# Patient Record
Sex: FEMALE | Race: BLACK OR AFRICAN AMERICAN | NOT HISPANIC OR LATINO | Employment: OTHER | URBAN - METROPOLITAN AREA
[De-identification: names, ages, dates, MRNs, and addresses within clinical notes are randomized per-mention and may not be internally consistent; named-entity substitution may affect disease eponyms.]

---

## 2019-06-05 PROBLEM — Z91.81 AT HIGH RISK FOR INJURY RELATED TO FALL: Status: ACTIVE | Noted: 2019-06-05

## 2019-06-07 PROBLEM — Z79.4 TYPE 2 DIABETES MELLITUS WITHOUT COMPLICATION, WITH LONG-TERM CURRENT USE OF INSULIN: Status: ACTIVE | Noted: 2019-06-07

## 2019-06-07 PROBLEM — I51.89 GRADE I DIASTOLIC DYSFUNCTION: Status: ACTIVE | Noted: 2019-06-07

## 2019-06-07 PROBLEM — I10 ESSENTIAL HYPERTENSION: Status: ACTIVE | Noted: 2019-06-07

## 2019-06-07 PROBLEM — E55.9 VITAMIN D DEFICIENCY: Status: ACTIVE | Noted: 2019-06-07

## 2019-06-07 PROBLEM — J45.30 MILD PERSISTENT ASTHMA WITHOUT COMPLICATION: Status: ACTIVE | Noted: 2019-06-07

## 2019-06-07 PROBLEM — Z86.79 HISTORY OF SUBARACHNOID HEMORRHAGE: Status: ACTIVE | Noted: 2019-06-07

## 2019-06-07 PROBLEM — E11.9 TYPE 2 DIABETES MELLITUS WITHOUT COMPLICATION, WITH LONG-TERM CURRENT USE OF INSULIN: Status: ACTIVE | Noted: 2019-06-07

## 2019-06-07 PROBLEM — E78.49 OTHER HYPERLIPIDEMIA: Status: ACTIVE | Noted: 2019-06-07

## 2019-06-07 PROBLEM — R56.9 PARTIAL SEIZURES: Status: ACTIVE | Noted: 2019-06-07

## 2019-06-07 PROBLEM — F32.9 MAJOR DEPRESSIVE DISORDER: Status: ACTIVE | Noted: 2019-06-07

## 2019-06-07 PROBLEM — Z86.73 HISTORY OF STROKE: Status: ACTIVE | Noted: 2019-06-07

## 2019-06-07 PROBLEM — G57.12 MERALGIA PARAESTHETICA, LEFT: Status: ACTIVE | Noted: 2019-06-07

## 2020-10-21 PROBLEM — R55 SYNCOPE: Status: ACTIVE | Noted: 2020-10-21

## 2020-10-21 PROBLEM — I25.10 CAD (CORONARY ARTERY DISEASE): Status: ACTIVE | Noted: 2020-10-21

## 2020-10-21 PROBLEM — I16.0 HYPERTENSIVE URGENCY: Status: ACTIVE | Noted: 2020-10-21

## 2020-10-21 PROBLEM — R07.9 CHEST PAIN: Status: ACTIVE | Noted: 2020-10-21

## 2020-10-21 PROBLEM — I16.1 HYPERTENSIVE EMERGENCY: Status: ACTIVE | Noted: 2020-10-21

## 2020-10-23 PROBLEM — F43.24 ADJUSTMENT DISORDER WITH DISTURBANCE OF CONDUCT: Status: ACTIVE | Noted: 2020-10-23

## 2020-10-24 PROBLEM — R56.9 PARTIAL SEIZURES: Status: RESOLVED | Noted: 2019-06-07 | Resolved: 2020-10-24

## 2020-10-24 PROBLEM — R07.9 CHEST PAIN: Status: RESOLVED | Noted: 2020-10-21 | Resolved: 2020-10-24

## 2020-10-24 PROBLEM — I16.1 HYPERTENSIVE EMERGENCY: Status: RESOLVED | Noted: 2020-10-21 | Resolved: 2020-10-24

## 2021-02-26 PROBLEM — R94.39 ABNORMAL STRESS TEST: Status: ACTIVE | Noted: 2021-02-26

## 2021-03-08 PROBLEM — R09.89 SUSPECTED STROKE PATIENT LAST KNOWN TO BE WELL 2 TO 3 HOURS AGO: Status: ACTIVE | Noted: 2021-03-08

## 2021-03-08 PROBLEM — G40.909 SEIZURE DISORDER: Status: ACTIVE | Noted: 2019-06-07

## 2021-03-08 PROBLEM — Z98.890 STATUS POST LEFT HEART CATHETERIZATION: Status: ACTIVE | Noted: 2021-03-08

## 2021-05-12 ENCOUNTER — PATIENT MESSAGE (OUTPATIENT)
Dept: RESEARCH | Facility: HOSPITAL | Age: 66
End: 2021-05-12

## 2021-07-01 ENCOUNTER — PATIENT MESSAGE (OUTPATIENT)
Dept: ADMINISTRATIVE | Facility: OTHER | Age: 66
End: 2021-07-01

## 2022-02-16 PROBLEM — I16.0 HYPERTENSIVE URGENCY: Status: RESOLVED | Noted: 2020-10-21 | Resolved: 2022-02-16

## 2023-03-15 PROBLEM — I63.9 CEREBROVASCULAR ACCIDENT (CVA): Status: ACTIVE | Noted: 2023-03-15

## 2023-03-15 PROBLEM — E11.69 HYPERLIPIDEMIA ASSOCIATED WITH TYPE 2 DIABETES MELLITUS: Status: ACTIVE | Noted: 2019-06-07

## 2023-03-15 PROBLEM — E78.5 HYPERLIPIDEMIA ASSOCIATED WITH TYPE 2 DIABETES MELLITUS: Status: ACTIVE | Noted: 2019-06-07

## 2023-03-15 PROBLEM — I73.9 PAD (PERIPHERAL ARTERY DISEASE): Status: ACTIVE | Noted: 2023-03-15

## 2023-03-16 PROBLEM — G93.41 ENCEPHALOPATHY, METABOLIC: Status: ACTIVE | Noted: 2023-03-16

## 2023-06-19 PROBLEM — I63.9 CEREBROVASCULAR ACCIDENT (CVA): Status: RESOLVED | Noted: 2023-03-15 | Resolved: 2023-06-19

## 2023-10-09 PROBLEM — J96.01 ACUTE RESPIRATORY FAILURE WITH HYPOXIA AND HYPERCARBIA: Status: ACTIVE | Noted: 2023-10-09

## 2023-10-09 PROBLEM — I47.29 NSVT (NONSUSTAINED VENTRICULAR TACHYCARDIA): Status: ACTIVE | Noted: 2023-10-09

## 2023-10-09 PROBLEM — I50.9 ACUTE ON CHRONIC HEART FAILURE: Status: ACTIVE | Noted: 2023-10-09

## 2023-10-09 PROBLEM — J96.02 ACUTE RESPIRATORY FAILURE WITH HYPOXIA AND HYPERCARBIA: Status: ACTIVE | Noted: 2023-10-09

## 2023-10-16 PROBLEM — I16.1 HYPERTENSIVE EMERGENCY: Status: ACTIVE | Noted: 2023-10-16

## 2023-10-16 PROBLEM — I35.1 NONRHEUMATIC AORTIC VALVE INSUFFICIENCY: Status: ACTIVE | Noted: 2023-10-16

## 2023-10-16 PROBLEM — I34.0 NONRHEUMATIC MITRAL VALVE REGURGITATION: Status: ACTIVE | Noted: 2023-10-16

## 2023-10-24 ENCOUNTER — TELEPHONE (OUTPATIENT)
Dept: ADMINISTRATIVE | Facility: OTHER | Age: 68
End: 2023-10-24

## 2023-10-24 NOTE — TELEPHONE ENCOUNTER
Sw received a consult to assist with community resources. Sw called Patient ([phone]). A voice message was left requesting she call back.    Terri Breen LCSW    [phone]

## 2023-10-25 ENCOUNTER — TELEPHONE (OUTPATIENT)
Dept: ADMINISTRATIVE | Facility: OTHER | Age: 68
End: 2023-10-25

## 2023-10-25 NOTE — TELEPHONE ENCOUNTER
Sw received a consult to assist with community resources. Sw called Patient ([phone]). No one answered and voice mail was full. Jacek will try calling back at another time.    Terri Breen LCSW    [phone]

## 2023-10-26 ENCOUNTER — TELEPHONE (OUTPATIENT)
Dept: ADMINISTRATIVE | Facility: OTHER | Age: 68
End: 2023-10-26
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
## 2024-01-08 PROBLEM — J96.01 ACUTE RESPIRATORY FAILURE WITH HYPOXIA AND HYPERCARBIA: Status: RESOLVED | Noted: 2023-10-09 | Resolved: 2024-01-08

## 2024-01-08 PROBLEM — J96.02 ACUTE RESPIRATORY FAILURE WITH HYPOXIA AND HYPERCARBIA: Status: RESOLVED | Noted: 2023-10-09 | Resolved: 2024-01-08

## 2024-07-23 PROBLEM — Z91.199 NONCOMPLIANCE: Status: ACTIVE | Noted: 2024-07-23

## 2024-07-29 ENCOUNTER — PATIENT OUTREACH (OUTPATIENT)
Dept: ADMINISTRATIVE | Facility: HOSPITAL | Age: 69
End: 2024-07-29

## 2024-07-29 DIAGNOSIS — E11.9 TYPE 2 DIABETES MELLITUS WITHOUT COMPLICATION, WITHOUT LONG-TERM CURRENT USE OF INSULIN: Primary | ICD-10-CM

## 2024-07-29 DIAGNOSIS — Z12.31 BREAST CANCER SCREENING BY MAMMOGRAM: ICD-10-CM

## 2024-09-13 ENCOUNTER — PATIENT OUTREACH (OUTPATIENT)
Dept: ADMINISTRATIVE | Facility: HOSPITAL | Age: 69
End: 2024-09-13

## 2024-09-13 DIAGNOSIS — E11.9 TYPE 2 DIABETES MELLITUS WITHOUT COMPLICATION, WITHOUT LONG-TERM CURRENT USE OF INSULIN: Primary | ICD-10-CM

## 2024-09-17 ENCOUNTER — PATIENT OUTREACH (OUTPATIENT)
Dept: ADMINISTRATIVE | Facility: HOSPITAL | Age: 69
End: 2024-09-17

## 2024-10-01 ENCOUNTER — PATIENT MESSAGE (OUTPATIENT)
Dept: ADMINISTRATIVE | Facility: HOSPITAL | Age: 69
End: 2024-10-01

## 2024-10-01 ENCOUNTER — PATIENT OUTREACH (OUTPATIENT)
Dept: ADMINISTRATIVE | Facility: HOSPITAL | Age: 69
End: 2024-10-01

## 2024-10-01 DIAGNOSIS — E11.9 TYPE 2 DIABETES MELLITUS WITHOUT COMPLICATION, WITHOUT LONG-TERM CURRENT USE OF INSULIN: Primary | ICD-10-CM

## 2024-10-09 PROBLEM — K22.2 LOWER ESOPHAGEAL RING (SCHATZKI): Status: ACTIVE | Noted: 2024-10-09

## 2024-10-10 ENCOUNTER — PATIENT OUTREACH (OUTPATIENT)
Dept: ADMINISTRATIVE | Facility: HOSPITAL | Age: 69
End: 2024-10-10

## 2024-10-10 DIAGNOSIS — E11.9 TYPE 2 DIABETES MELLITUS WITHOUT COMPLICATION, WITHOUT LONG-TERM CURRENT USE OF INSULIN: Primary | ICD-10-CM

## 2024-10-10 PROBLEM — F17.200 NICOTINE DEPENDENCE: Status: ACTIVE | Noted: 2024-07-08

## 2024-10-10 PROBLEM — L29.9 PRURITIC DISORDER: Status: ACTIVE | Noted: 2024-07-08

## 2024-10-10 PROBLEM — Z79.84 LONG TERM (CURRENT) USE OF ORAL HYPOGLYCEMIC DRUGS: Status: ACTIVE | Noted: 2024-06-25

## 2024-10-10 PROBLEM — I69.354 HEMIPLGA FOLLOWING CEREBRAL INFRC AFFECTING LEFT NONDOM SIDE: Status: ACTIVE | Noted: 2024-01-01

## 2024-10-10 PROBLEM — I95.1 ORTHOSTATIC HYPOTENSION: Status: ACTIVE | Noted: 2024-10-10

## 2024-10-10 PROBLEM — I50.23 ACUTE ON CHRONIC SYSTOLIC (CONGESTIVE) HEART FAILURE: Status: ACTIVE | Noted: 2024-06-26

## 2024-10-10 PROBLEM — K29.70 GASTRITIS: Status: ACTIVE | Noted: 2024-10-10

## 2024-10-10 PROBLEM — I11.0 HYPERTENSIVE HEART DISEASE WITH HEART FAILURE: Status: ACTIVE | Noted: 2024-07-13

## 2024-10-10 PROBLEM — Z87.11 HISTORY OF GASTRIC ULCER: Status: ACTIVE | Noted: 2024-10-10

## 2024-10-10 PROBLEM — R53.81 PHYSICAL DECONDITIONING: Status: ACTIVE | Noted: 2024-10-10

## 2024-10-10 PROBLEM — G40.109 PARTIAL EPILEPSY: Status: ACTIVE | Noted: 2024-10-10

## 2024-10-10 PROBLEM — I69.311 MEMORY DEFICIT FOLLOWING CEREBRAL INFARCTION: Status: ACTIVE | Noted: 2024-01-01

## 2024-10-10 PROBLEM — J44.9 CHRONIC OBSTRUCTIVE PULMONARY DISEASE: Status: ACTIVE | Noted: 2024-01-01

## 2024-10-24 PROBLEM — E55.9 VITAMIN D DEFICIENCY: Chronic | Status: ACTIVE | Noted: 2019-06-07

## 2024-10-24 PROBLEM — R53.81 PHYSICAL DECONDITIONING: Chronic | Status: ACTIVE | Noted: 2024-10-10

## 2024-10-24 PROBLEM — F17.200 NICOTINE DEPENDENCE: Chronic | Status: ACTIVE | Noted: 2024-07-08

## 2024-10-24 PROBLEM — E11.69 HYPERLIPIDEMIA ASSOCIATED WITH TYPE 2 DIABETES MELLITUS: Chronic | Status: ACTIVE | Noted: 2019-06-07

## 2024-10-24 PROBLEM — I69.354 HEMIPLGA FOLLOWING CEREBRAL INFRC AFFECTING LEFT NONDOM SIDE: Chronic | Status: ACTIVE | Noted: 2024-01-01

## 2024-10-24 PROBLEM — I25.10 CAD (CORONARY ARTERY DISEASE): Chronic | Status: ACTIVE | Noted: 2020-10-21

## 2024-10-24 PROBLEM — F32.9 MAJOR DEPRESSIVE DISORDER: Chronic | Status: ACTIVE | Noted: 2019-06-07

## 2024-10-24 PROBLEM — Z98.890 STATUS POST LEFT HEART CATHETERIZATION: Chronic | Status: ACTIVE | Noted: 2021-03-08

## 2024-10-24 PROBLEM — I10 HYPERTENSION: Chronic | Status: ACTIVE | Noted: 2019-06-07

## 2024-10-24 PROBLEM — Z87.11 HISTORY OF GASTRIC ULCER: Chronic | Status: ACTIVE | Noted: 2024-10-10

## 2024-10-24 PROBLEM — I34.0 NONRHEUMATIC MITRAL VALVE REGURGITATION: Chronic | Status: ACTIVE | Noted: 2023-10-16

## 2024-10-24 PROBLEM — G40.909 SEIZURE DISORDER: Chronic | Status: ACTIVE | Noted: 2019-06-07

## 2024-10-24 PROBLEM — G40.109 PARTIAL EPILEPSY: Chronic | Status: ACTIVE | Noted: 2024-10-10

## 2024-10-24 PROBLEM — I51.89 GRADE I DIASTOLIC DYSFUNCTION: Chronic | Status: ACTIVE | Noted: 2019-06-07

## 2024-10-24 PROBLEM — E11.9 TYPE 2 DIABETES MELLITUS WITHOUT COMPLICATION, WITHOUT LONG-TERM CURRENT USE OF INSULIN: Chronic | Status: ACTIVE | Noted: 2019-06-07

## 2024-10-24 PROBLEM — K22.2 LOWER ESOPHAGEAL RING (SCHATZKI): Chronic | Status: ACTIVE | Noted: 2024-10-09

## 2024-10-24 PROBLEM — I35.1 NONRHEUMATIC AORTIC VALVE INSUFFICIENCY: Chronic | Status: ACTIVE | Noted: 2023-10-16

## 2024-10-24 PROBLEM — E78.5 HYPERLIPIDEMIA ASSOCIATED WITH TYPE 2 DIABETES MELLITUS: Chronic | Status: ACTIVE | Noted: 2019-06-07

## 2024-10-24 PROBLEM — I11.0 HYPERTENSIVE HEART DISEASE WITH HEART FAILURE: Chronic | Status: ACTIVE | Noted: 2024-07-13

## 2024-12-04 ENCOUNTER — OUTPATIENT CASE MANAGEMENT (OUTPATIENT)
Dept: ADMINISTRATIVE | Facility: OTHER | Age: 69
End: 2024-12-04

## 2024-12-04 NOTE — PROGRESS NOTES
Case Management Note    CHARLI received consult from MD noting:Home health paperwork     SW attempted to contact patient [phone]  to explore need, no answer, unable to leave message (voicemail full). SW will attempt contact at later time.     Barbara García MSW, LCSW  Social Work - Outpatient Case Management   [facility]- Gastroenterology   Desk: [phone]   Fax: [phone]      
negative...